# Patient Record
Sex: FEMALE | Race: OTHER | NOT HISPANIC OR LATINO
[De-identification: names, ages, dates, MRNs, and addresses within clinical notes are randomized per-mention and may not be internally consistent; named-entity substitution may affect disease eponyms.]

---

## 2024-02-09 ENCOUNTER — APPOINTMENT (OUTPATIENT)
Dept: OBGYN | Facility: CLINIC | Age: 24
End: 2024-02-09
Payer: COMMERCIAL

## 2024-02-09 ENCOUNTER — NON-APPOINTMENT (OUTPATIENT)
Age: 24
End: 2024-02-09

## 2024-02-09 VITALS
BODY MASS INDEX: 22.5 KG/M2 | SYSTOLIC BLOOD PRESSURE: 121 MMHG | OXYGEN SATURATION: 99 % | DIASTOLIC BLOOD PRESSURE: 84 MMHG | HEART RATE: 83 BPM | WEIGHT: 140 LBS | HEIGHT: 66 IN

## 2024-02-09 DIAGNOSIS — R10.2 PELVIC AND PERINEAL PAIN: ICD-10-CM

## 2024-02-09 DIAGNOSIS — Z82.49 FAMILY HISTORY OF ISCHEMIC HEART DISEASE AND OTHER DISEASES OF THE CIRCULATORY SYSTEM: ICD-10-CM

## 2024-02-09 DIAGNOSIS — N92.6 IRREGULAR MENSTRUATION, UNSPECIFIED: ICD-10-CM

## 2024-02-09 DIAGNOSIS — N83.209 UNSPECIFIED OVARIAN CYST, UNSPECIFIED SIDE: ICD-10-CM

## 2024-02-09 DIAGNOSIS — K21.9 GASTRO-ESOPHAGEAL REFLUX DISEASE W/OUT ESOPHAGITIS: ICD-10-CM

## 2024-02-09 DIAGNOSIS — N92.1 EXCESSIVE AND FREQUENT MENSTRUATION WITH IRREGULAR CYCLE: ICD-10-CM

## 2024-02-09 DIAGNOSIS — Z83.79 FAMILY HISTORY OF OTHER DISEASES OF THE DIGESTIVE SYSTEM: ICD-10-CM

## 2024-02-09 DIAGNOSIS — Z83.49 FAMILY HISTORY OF OTHER ENDOCRINE, NUTRITIONAL AND METABOLIC DISEASES: ICD-10-CM

## 2024-02-09 DIAGNOSIS — Z80.3 FAMILY HISTORY OF MALIGNANT NEOPLASM OF BREAST: ICD-10-CM

## 2024-02-09 DIAGNOSIS — Z97.5 EXCESSIVE AND FREQUENT MENSTRUATION WITH IRREGULAR CYCLE: ICD-10-CM

## 2024-02-09 DIAGNOSIS — N94.12 DEEP DYSPAREUNIA: ICD-10-CM

## 2024-02-09 DIAGNOSIS — Z80.42 FAMILY HISTORY OF MALIGNANT NEOPLASM OF PROSTATE: ICD-10-CM

## 2024-02-09 DIAGNOSIS — Z78.9 OTHER SPECIFIED HEALTH STATUS: ICD-10-CM

## 2024-02-09 PROBLEM — Z00.00 ENCOUNTER FOR PREVENTIVE HEALTH EXAMINATION: Status: ACTIVE | Noted: 2024-02-09

## 2024-02-09 PROCEDURE — 99204 OFFICE O/P NEW MOD 45 MIN: CPT

## 2024-02-10 RX ORDER — PANTOPRAZOLE SODIUM 40 MG/1
40 GRANULE, DELAYED RELEASE ORAL
Refills: 0 | Status: ACTIVE | COMMUNITY

## 2024-02-10 RX ORDER — ETONOGESTREL 68 MG/1
IMPLANT SUBCUTANEOUS
Refills: 0 | Status: ACTIVE | COMMUNITY

## 2024-02-10 NOTE — COUNSELING
[Vitamins/Supplements] : vitamins/supplements [Nutrition/ Exercise/ Weight Management] : nutrition, exercise, weight management [Drugs/Alcohol] : drugs, alcohol [Contraception/ Emergency Contraception/ Safe Sexual Practices] : contraception, emergency contraception, safe sexual practices [Other ___] : [unfilled]

## 2024-02-11 PROBLEM — N92.6 IRREGULAR MENSTRUAL CYCLE: Status: ACTIVE | Noted: 2024-02-11

## 2024-02-11 PROBLEM — Z78.9 CONSUMES ALCOHOL OCCASIONALLY: Status: ACTIVE | Noted: 2024-02-11

## 2024-02-11 PROBLEM — N94.12 DEEP DYSPAREUNIA IN FEMALE: Status: ACTIVE | Noted: 2024-02-11

## 2024-02-11 PROBLEM — N92.1 BREAKTHROUGH BLEEDING ON NEXPLANON: Status: ACTIVE | Noted: 2024-02-11

## 2024-02-11 PROBLEM — R10.2 PELVIC PAIN: Status: ACTIVE | Noted: 2024-02-11

## 2024-02-11 NOTE — HISTORY OF PRESENT ILLNESS
[Patient reported PAP Smear was normal] : Patient reported PAP Smear was normal [LMP unknown] : LMP unknown [IUD] : has an intrauterine device [Y] : Patient is sexually active [N] : Patient denies prior pregnancies [unknown] : Patient is unsure of the date of her LMP [Menarche Age: ____] : age at menarche was [unfilled] [Currently Active] : currently active [Men] : men [No] : No [Yes] : Yes [FreeTextEntry1] : 24 yo patient presents to establish GYN care.  Patient states she has always had irregular periods. She was told in 2022 she has a right ovarian cyst and to monitor it.  Irregular period on Nexplanon Period has always been irregular, would get 2x month so went on birth control, so now gets once a month usually on Nexplanon but different amounts of bleeding. Recalls was told RIGHT ovarian cyst was about 2-3 cm and was told to monitor it d/t pelvic pain. Reports having pain in abdomen sometimes with intercourse. She never was told she needed surgery.    ------------------------------------------------------------------------------------------------------------------------------------------------------------------------------------------ PMH: GERD  PSH: Tonsillectomy, recurrent abscesses - uncomplicated   OB Hx: G0, never TTC  GYN Hx:  Last pap 6/2023, first pap and was normal per pt, no h/o HPV Completed Gardasil vaccine series Denies h/o STDs Denies h/o uterine fibroids  h/o ovarian cysts as above No breast issues; has never had breast imaging.  Family Hx: Pat aunt - breast cancer - dx 50s  Meds: Nexplanon (2nd one; this one placed Sept 2022) GERD med  Allergies: NKDA  Social Hx: Never a smoker Rare alcohol use Single, in a relationship with male partner  Sexually active [de-identified] : Nexplanon  [PapSmeardate] : 06/23 [FreeTextEntry3] : Nexplanon

## 2024-02-11 NOTE — PHYSICAL EXAM
[Appropriately responsive] : appropriately responsive [Alert] : alert [No Acute Distress] : no acute distress [Soft] : soft [Non-distended] : non-distended [Non-tender] : non-tender [No HSM] : No HSM [No Lesions] : no lesions [No Mass] : no mass [Oriented x3] : oriented x3 [FreeTextEntry5] : No increased work of breathing

## 2024-02-11 NOTE — PLAN
[FreeTextEntry1] : Here to establish GYN care. Concerned about pelvic pain and was told she has a RIGHT ovarian cyst that she should have monitored. Pt denies being told she needs surgery for cyst. Remembers that the RIGHT ovarian cyst was about 2-3 cm. She is having irregular bleeding on Nexplanon but still getting a period - this is her second Nexplanon for contraception. Also having deep dyspareunia with intercourse sometimes - discussed could be 2/2 ovarian cyst, discussed position changes.  Referral for TVUS and pelvic u/s given.  Further management/treatment pending results. GERD - taking omeprazole with some relief.  Following with GI. Return for discussion of results and further plan.  I spent a total of 45 minutes on the date of the encounter in evaluating and treating the patient.  Previous records reviewed with patient.  Education provided.  Questions answered.  Coordination of care done.  Plan of care and next steps in workup thoroughly discussed with patient.

## 2024-04-01 ENCOUNTER — APPOINTMENT (OUTPATIENT)
Dept: ANTEPARTUM | Facility: CLINIC | Age: 24
End: 2024-04-01
Payer: COMMERCIAL

## 2024-04-01 ENCOUNTER — ASOB RESULT (OUTPATIENT)
Age: 24
End: 2024-04-01

## 2024-04-01 PROCEDURE — 76830 TRANSVAGINAL US NON-OB: CPT

## 2024-04-01 PROCEDURE — 76376 3D RENDER W/INTRP POSTPROCES: CPT

## 2024-04-01 PROCEDURE — 76856 US EXAM PELVIC COMPLETE: CPT

## 2024-12-09 ENCOUNTER — NON-APPOINTMENT (OUTPATIENT)
Age: 24
End: 2024-12-09

## 2024-12-09 ENCOUNTER — APPOINTMENT (OUTPATIENT)
Dept: OBGYN | Facility: CLINIC | Age: 24
End: 2024-12-09

## 2024-12-09 VITALS
HEIGHT: 66 IN | SYSTOLIC BLOOD PRESSURE: 118 MMHG | HEART RATE: 85 BPM | BODY MASS INDEX: 21.53 KG/M2 | OXYGEN SATURATION: 99 % | WEIGHT: 134 LBS | DIASTOLIC BLOOD PRESSURE: 78 MMHG

## 2024-12-09 PROCEDURE — 99214 OFFICE O/P EST MOD 30 MIN: CPT

## 2025-05-10 ENCOUNTER — EMERGENCY (EMERGENCY)
Facility: HOSPITAL | Age: 25
LOS: 1 days | End: 2025-05-10
Attending: EMERGENCY MEDICINE | Admitting: EMERGENCY MEDICINE
Payer: COMMERCIAL

## 2025-05-10 VITALS
TEMPERATURE: 99 F | SYSTOLIC BLOOD PRESSURE: 142 MMHG | DIASTOLIC BLOOD PRESSURE: 87 MMHG | RESPIRATION RATE: 18 BRPM | HEART RATE: 104 BPM | WEIGHT: 138.01 LBS | OXYGEN SATURATION: 100 %

## 2025-05-10 VITALS
DIASTOLIC BLOOD PRESSURE: 73 MMHG | OXYGEN SATURATION: 100 % | TEMPERATURE: 98 F | SYSTOLIC BLOOD PRESSURE: 111 MMHG | HEART RATE: 70 BPM | RESPIRATION RATE: 16 BRPM

## 2025-05-10 DIAGNOSIS — H57.02 ANISOCORIA: ICD-10-CM

## 2025-05-10 DIAGNOSIS — R51.9 HEADACHE, UNSPECIFIED: ICD-10-CM

## 2025-05-10 DIAGNOSIS — H53.8 OTHER VISUAL DISTURBANCES: ICD-10-CM

## 2025-05-10 LAB
ALBUMIN SERPL ELPH-MCNC: 4.2 G/DL — SIGNIFICANT CHANGE UP (ref 3.4–5)
ALP SERPL-CCNC: 63 U/L — SIGNIFICANT CHANGE UP (ref 40–120)
ALT FLD-CCNC: 23 U/L — SIGNIFICANT CHANGE UP (ref 12–42)
ANION GAP SERPL CALC-SCNC: 11 MMOL/L — SIGNIFICANT CHANGE UP (ref 9–16)
APTT BLD: 40.7 SEC — HIGH (ref 26.1–36.8)
AST SERPL-CCNC: 22 U/L — SIGNIFICANT CHANGE UP (ref 15–37)
BASOPHILS # BLD AUTO: 0.03 K/UL — SIGNIFICANT CHANGE UP (ref 0–0.2)
BASOPHILS NFR BLD AUTO: 0.4 % — SIGNIFICANT CHANGE UP (ref 0–2)
BILIRUB SERPL-MCNC: 0.5 MG/DL — SIGNIFICANT CHANGE UP (ref 0.2–1.2)
BUN SERPL-MCNC: 14 MG/DL — SIGNIFICANT CHANGE UP (ref 7–23)
CALCIUM SERPL-MCNC: 9.2 MG/DL — SIGNIFICANT CHANGE UP (ref 8.5–10.5)
CHLORIDE SERPL-SCNC: 101 MMOL/L — SIGNIFICANT CHANGE UP (ref 96–108)
CO2 SERPL-SCNC: 24 MMOL/L — SIGNIFICANT CHANGE UP (ref 22–31)
CREAT SERPL-MCNC: 0.76 MG/DL — SIGNIFICANT CHANGE UP (ref 0.5–1.3)
EGFR: 112 ML/MIN/1.73M2 — SIGNIFICANT CHANGE UP
EGFR: 112 ML/MIN/1.73M2 — SIGNIFICANT CHANGE UP
EOSINOPHIL # BLD AUTO: 0.13 K/UL — SIGNIFICANT CHANGE UP (ref 0–0.5)
EOSINOPHIL NFR BLD AUTO: 1.6 % — SIGNIFICANT CHANGE UP (ref 0–6)
GLUCOSE SERPL-MCNC: 93 MG/DL — SIGNIFICANT CHANGE UP (ref 70–99)
HCT VFR BLD CALC: 40.3 % — SIGNIFICANT CHANGE UP (ref 34.5–45)
HGB BLD-MCNC: 12.6 G/DL — SIGNIFICANT CHANGE UP (ref 11.5–15.5)
IMM GRANULOCYTES # BLD AUTO: 0.02 K/UL — SIGNIFICANT CHANGE UP (ref 0–0.07)
IMM GRANULOCYTES NFR BLD AUTO: 0.2 % — SIGNIFICANT CHANGE UP (ref 0–0.9)
INR BLD: 1 — SIGNIFICANT CHANGE UP (ref 0.85–1.16)
LYMPHOCYTES # BLD AUTO: 2.64 K/UL — SIGNIFICANT CHANGE UP (ref 1–3.3)
LYMPHOCYTES NFR BLD AUTO: 31.7 % — SIGNIFICANT CHANGE UP (ref 13–44)
MCHC RBC-ENTMCNC: 29 PG — SIGNIFICANT CHANGE UP (ref 27–34)
MCHC RBC-ENTMCNC: 31.3 G/DL — LOW (ref 32–36)
MCV RBC AUTO: 92.9 FL — SIGNIFICANT CHANGE UP (ref 80–100)
MONOCYTES # BLD AUTO: 0.71 K/UL — SIGNIFICANT CHANGE UP (ref 0–0.9)
MONOCYTES NFR BLD AUTO: 8.5 % — SIGNIFICANT CHANGE UP (ref 2–14)
NEUTROPHILS # BLD AUTO: 4.79 K/UL — SIGNIFICANT CHANGE UP (ref 1.8–7.4)
NEUTROPHILS NFR BLD AUTO: 57.6 % — SIGNIFICANT CHANGE UP (ref 43–77)
NRBC # BLD AUTO: 0 K/UL — SIGNIFICANT CHANGE UP (ref 0–0)
NRBC # FLD: 0 K/UL — SIGNIFICANT CHANGE UP (ref 0–0)
NRBC BLD AUTO-RTO: 0 /100 WBCS — SIGNIFICANT CHANGE UP (ref 0–0)
PLATELET # BLD AUTO: 254 K/UL — SIGNIFICANT CHANGE UP (ref 150–400)
PMV BLD: 9.8 FL — SIGNIFICANT CHANGE UP (ref 7–13)
POTASSIUM SERPL-MCNC: 4.1 MMOL/L — SIGNIFICANT CHANGE UP (ref 3.5–5.3)
POTASSIUM SERPL-SCNC: 4.1 MMOL/L — SIGNIFICANT CHANGE UP (ref 3.5–5.3)
PROT SERPL-MCNC: 7 G/DL — SIGNIFICANT CHANGE UP (ref 6.4–8.2)
PROTHROM AB SERPL-ACNC: 11.7 SEC — SIGNIFICANT CHANGE UP (ref 9.9–13.4)
RBC # BLD: 4.34 M/UL — SIGNIFICANT CHANGE UP (ref 3.8–5.2)
RBC # FLD: 13.1 % — SIGNIFICANT CHANGE UP (ref 10.3–14.5)
SODIUM SERPL-SCNC: 136 MMOL/L — SIGNIFICANT CHANGE UP (ref 132–145)
TROPONIN I, HIGH SENSITIVITY RESULT: 4.3 NG/L — SIGNIFICANT CHANGE UP
WBC # BLD: 8.32 K/UL — SIGNIFICANT CHANGE UP (ref 3.8–10.5)
WBC # FLD AUTO: 8.32 K/UL — SIGNIFICANT CHANGE UP (ref 3.8–10.5)

## 2025-05-10 PROCEDURE — 99291 CRITICAL CARE FIRST HOUR: CPT

## 2025-05-10 PROCEDURE — 70498 CT ANGIOGRAPHY NECK: CPT | Mod: 26

## 2025-05-10 PROCEDURE — 70496 CT ANGIOGRAPHY HEAD: CPT | Mod: 26

## 2025-05-10 PROCEDURE — 0042T: CPT

## 2025-05-10 PROCEDURE — 70450 CT HEAD/BRAIN W/O DYE: CPT | Mod: 26,59

## 2025-05-10 NOTE — ED PROVIDER NOTE - PHYSICAL EXAMINATION
VITAL SIGNS: I have reviewed nursing notes and confirm.   CONST: Well-developed; well-nourished; No apparent distress.  ENT: No nasal discharge; airway clear.  HEAD: Normocephalic; atraumatic.  EYES: Sclera clear. Pupils round and symmetrical bilaterally. EOMI, no Ptosis, dilated R pupil, not reactive, no consensual reaction, no constriction with accomodation, slightly blurry vision R eye.  CARD: S1, S2 normal; no murmurs, gallops, or rubs. Regular rate and rhythm.  RESP: No wheezes, rales or rhonchi. Breathing comfortably; speaking in full sentences.   ABD: Soft; non-distended; non-tender; no rebound or guarding.  MSK: No acute deformities noted to extremities.   NEURO: Alert, oriented. Speech is fluent and appropriate. Moving all extremities appropriately. No gross motor or sensory abnormalities. NIHSS 0  SKIN: Skin is normal temperature; no diaphoresis; no pallor.   PSYCH: Cooperative. Appropriate mood, language, and behavior.

## 2025-05-10 NOTE — ED PROVIDER NOTE - CLINICAL SUMMARY MEDICAL DECISION MAKING FREE TEXT BOX
25-year-old female presenting with acute onset anisocoria of the right eye.  She has dilated pupil on that side associated with a mild headache.  It is unclear whether the headache is directly related to the dilated pupil or whether it may be secondary to stress.  Code stroke was activated based on the acuity of the symptoms.  She was seen immediately upon arrival.  Differential diagnosis includes pharmacologic mydriasis, cranial nerve palsy, aneurysm, vertebral artery dissection etc.

## 2025-05-10 NOTE — ED PROVIDER NOTE - NSFOLLOWUPINSTRUCTIONS_ED_ALL_ED_FT
We evaluated you for all of the worrisome causes of a dilated pupil.  The most likely cause is some pharmacologic exposure possibly from something that you touched that another person had touched or a product that got into your eye.  If you have persistent symptoms by early next week the ophthalmology department can order additional tests.  If you develop any new or worsening symptoms please come back to the emergency department.

## 2025-05-10 NOTE — ED PROVIDER NOTE - PROGRESS NOTE DETAILS
Patient's brain imaging was within normal limits.  Stroke neurology did not feel like her presentation was consistent with a stroke.  I discussed the case with on-call ophthalmology who think that in view of the negative imaging it is most likely a pharmacologic exposure.  They will reach out to her to make a follow-up appointment.  Will discharge with conservative management. Patient's brain imaging was within normal limits.  Stroke neurology did not feel like her presentation was consistent with a stroke.  I discussed the case with on-call ophthalmology Dr. Lares who thinks that in view of the negative imaging it is most likely a pharmacologic exposure.  They will reach out to her to make a follow-up appointment nest week.  Will discharge with conservative management. Patient's eye exam was unchanged.  I reviewed the results and the plan with her and she was in agreement.  I also gave her strict return precautions

## 2025-05-10 NOTE — ED PROVIDER NOTE - CRITICAL CARE ATTENDING CONTRIBUTION TO CARE
The patient was seen immediately upon arrival due to a high probability of imminent or life-threatening deterioration secondary to r/o stroke, which required my direct attention, intervention, and personal management at the bedside. I have personally provided critical care time exclusive of time spent on separately billable procedures. Time includes review of laboratory data, radiology results, discussion with consultants, discussion with the patient's family, and monitoring for potential decompensation.

## 2025-05-10 NOTE — ED PROVIDER NOTE - PATIENT PORTAL LINK FT
You can access the FollowMyHealth Patient Portal offered by [facility] by registering at the following website: [email or web address]. By joining [facility]’s FollowMyHealth portal, you will also be able to view your health information using other applications (apps) compatible with our system.

## 2025-05-10 NOTE — ED ADULT TRIAGE NOTE - CHIEF COMPLAINT QUOTE
pt c/o headache starting at 9am and associated sensitivity to light. pt noted R pupil is dilated and is not constricting to light, states +vision changes in affected eye. denies head trauma, denies dizziness, denies ataxia, denies decreased sensation unilaterally. code stroke called in triage. LKW 1am.

## 2025-05-10 NOTE — ED PROVIDER NOTE - OBJECTIVE STATEMENT
25-year-old female presenting with blurriness to the right eye for the past 2 hours.  She also has a mild headache.  She also noticed that the light was bothering that eye a bit and had to use sunglasses when out shopping.  She went to a bathroom to check her eye and noticed that her pupil was dilated got worried and came to the ED.  She has no history of dilated pupils and does not use any topical eyedrops or patches.  She also does not have any sleeping partners.  She does not use any stimulants or medications or new creams around her eyes recently.